# Patient Record
Sex: FEMALE | Race: WHITE | NOT HISPANIC OR LATINO | URBAN - METROPOLITAN AREA
[De-identification: names, ages, dates, MRNs, and addresses within clinical notes are randomized per-mention and may not be internally consistent; named-entity substitution may affect disease eponyms.]

---

## 2023-09-17 RX ORDER — LEVONORGESTREL AND ETHINYL ESTRADIOL 0.15-0.03
1 KIT ORAL DAILY
COMMUNITY
End: 2023-10-13 | Stop reason: SDUPTHER

## 2023-09-17 RX ORDER — BUSPIRONE HYDROCHLORIDE 7.5 MG/1
1 TABLET ORAL 2 TIMES DAILY
COMMUNITY

## 2023-10-13 ENCOUNTER — TELEPHONE (OUTPATIENT)
Dept: OBSTETRICS AND GYNECOLOGY | Facility: CLINIC | Age: 23
End: 2023-10-13
Payer: COMMERCIAL

## 2023-10-13 DIAGNOSIS — Z30.41 SURVEILLANCE FOR BIRTH CONTROL, ORAL CONTRACEPTIVES: Primary | ICD-10-CM

## 2023-10-13 RX ORDER — LEVONORGESTREL AND ETHINYL ESTRADIOL 0.15-0.03
1 KIT ORAL DAILY
Qty: 84 TABLET | Refills: 0 | Status: SHIPPED | OUTPATIENT
Start: 2023-10-13 | End: 2023-11-22 | Stop reason: SDUPTHER

## 2023-10-13 NOTE — TELEPHONE ENCOUNTER
Pt calling has annual in November, requesting refill for her Altavera until her annual. Please send to pharmacy on file

## 2023-11-21 PROBLEM — Z30.41 SURVEILLANCE FOR BIRTH CONTROL, ORAL CONTRACEPTIVES: Status: ACTIVE | Noted: 2023-11-21

## 2023-11-21 PROBLEM — Z01.419 ENCOUNTER FOR ANNUAL ROUTINE GYNECOLOGICAL EXAMINATION: Status: ACTIVE | Noted: 2023-11-21

## 2023-11-21 PROBLEM — Z11.3 SCREEN FOR STD (SEXUALLY TRANSMITTED DISEASE): Status: ACTIVE | Noted: 2023-11-21

## 2023-11-21 ASSESSMENT — ENCOUNTER SYMPTOMS
VOMITING: 0
SHORTNESS OF BREATH: 0
FEVER: 0
CHILLS: 0
COUGH: 0
FATIGUE: 0
DYSURIA: 0
UNEXPECTED WEIGHT CHANGE: 0
NAUSEA: 0
HEADACHES: 0
COLOR CHANGE: 0
ABDOMINAL PAIN: 0
DIZZINESS: 0

## 2023-11-22 ENCOUNTER — OFFICE VISIT (OUTPATIENT)
Dept: OBSTETRICS AND GYNECOLOGY | Facility: CLINIC | Age: 23
End: 2023-11-22
Payer: COMMERCIAL

## 2023-11-22 VITALS
DIASTOLIC BLOOD PRESSURE: 88 MMHG | WEIGHT: 142 LBS | BODY MASS INDEX: 21.52 KG/M2 | SYSTOLIC BLOOD PRESSURE: 136 MMHG | HEIGHT: 68 IN

## 2023-11-22 DIAGNOSIS — Z30.41 SURVEILLANCE FOR BIRTH CONTROL, ORAL CONTRACEPTIVES: ICD-10-CM

## 2023-11-22 DIAGNOSIS — Z11.3 SCREEN FOR STD (SEXUALLY TRANSMITTED DISEASE): ICD-10-CM

## 2023-11-22 DIAGNOSIS — Z01.419 ENCOUNTER FOR ANNUAL ROUTINE GYNECOLOGICAL EXAMINATION: Primary | ICD-10-CM

## 2023-11-22 PROCEDURE — 99395 PREV VISIT EST AGE 18-39: CPT

## 2023-11-22 PROCEDURE — 1036F TOBACCO NON-USER: CPT

## 2023-11-22 PROCEDURE — 87800 DETECT AGNT MULT DNA DIREC: CPT

## 2023-11-22 RX ORDER — LEVONORGESTREL AND ETHINYL ESTRADIOL 0.15-0.03
1 KIT ORAL DAILY
Qty: 84 TABLET | Refills: 3 | Status: SHIPPED | OUTPATIENT
Start: 2023-11-22 | End: 2024-11-21

## 2023-11-22 ASSESSMENT — ENCOUNTER SYMPTOMS
PSYCHIATRIC NEGATIVE: 0
CARDIOVASCULAR NEGATIVE: 0
GASTROINTESTINAL NEGATIVE: 0
CONSTITUTIONAL NEGATIVE: 0
ENDOCRINE NEGATIVE: 0
HEMATOLOGIC/LYMPHATIC NEGATIVE: 0
MUSCULOSKELETAL NEGATIVE: 0
EYES NEGATIVE: 0
NEUROLOGICAL NEGATIVE: 0
RESPIRATORY NEGATIVE: 0
ALLERGIC/IMMUNOLOGIC NEGATIVE: 0

## 2023-11-22 ASSESSMENT — LIFESTYLE VARIABLES
HOW OFTEN DO YOU HAVE A DRINK CONTAINING ALCOHOL: 2-4 TIMES A MONTH
SKIP TO QUESTIONS 9-10: 1
HOW MANY STANDARD DRINKS CONTAINING ALCOHOL DO YOU HAVE ON A TYPICAL DAY: 1 OR 2
HOW OFTEN DO YOU HAVE SIX OR MORE DRINKS ON ONE OCCASION: NEVER
AUDIT-C TOTAL SCORE: 2

## 2023-11-22 ASSESSMENT — PATIENT HEALTH QUESTIONNAIRE - PHQ9
1. LITTLE INTEREST OR PLEASURE IN DOING THINGS: NOT AT ALL
2. FEELING DOWN, DEPRESSED OR HOPELESS: NOT AT ALL
SUM OF ALL RESPONSES TO PHQ9 QUESTIONS 1 & 2: 0

## 2023-11-22 NOTE — PROGRESS NOTES
"Subjective   Alannah Simeon is a 23 y.o. female who is here for a routine GYN exam. Last saw Dr. Finn 2022. Doing well, no concerns or complaints. 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 Denies vaginal irritation symptoms. Denies breast changes or concerns.    Complaints: none  Periods: regular  Dysmenorrhea: none    Current contraception: OCP  History of abnormal Pap smear: yes  History of abnormal mammogram: no      OB History          0    Para   0    Term   0       0    AB   0    Living   0         SAB   0    IAB   0    Ectopic   0    Multiple   0    Live Births   0                  Review of Systems   Constitutional:  Negative for chills, fatigue, fever and unexpected weight change.   Respiratory:  Negative for cough and shortness of breath.    Gastrointestinal:  Negative for abdominal pain, nausea and vomiting.   Genitourinary:  Negative for dyspareunia, dysuria, pelvic pain and vaginal discharge.   Skin:  Negative for color change and rash.   Neurological:  Negative for dizziness and headaches.       Objective   /88   Ht 1.715 m (5' 7.5\")   Wt 64.4 kg (142 lb)   LMP 2023   BMI 21.91 kg/m²        General:   Alert and oriented, in no acute distress   Neck: Supple. No visible thyromegaly.    Breast/Axilla: Normal to palpation bilaterally without masses, skin changes, or nipple discharge.    Abdomen: Soft, non-tender, without masses or organomegaly   Vulva: Normal architecture without erythema, masses, or lesions.    Vagina: Normal mucosa without lesions, masses, or atrophy. Small amount discharge.   Cervix: Normal without masses, lesions, or signs of cervicitis; STI screening swab obtained   Uterus: Normal, mobile, non-enlarged uterus   Adnexa: Normal without masses or lesions   Pelvic Floor Normal    Psych Normal affect. Normal mood.      Assessment/Plan   -UTD on pap smear, last pap ASCUS, HPV " negative; next pap due 8/2025.  -STI screening obtained.  -Continue OCP as tolerated, no CI to medication at this time, refills sent to patient's pharmacy for 1 year supply.    Fe Alonso PA-C

## 2023-11-23 LAB
C TRACH RRNA SPEC QL NAA+PROBE: NEGATIVE
N GONORRHOEA DNA SPEC QL PROBE+SIG AMP: NEGATIVE

## 2024-07-10 ENCOUNTER — TELEPHONE (OUTPATIENT)
Dept: OBSTETRICS AND GYNECOLOGY | Facility: CLINIC | Age: 24
End: 2024-07-10
Payer: COMMERCIAL

## 2024-07-10 DIAGNOSIS — Z30.41 SURVEILLANCE FOR BIRTH CONTROL, ORAL CONTRACEPTIVES: ICD-10-CM

## 2024-07-10 RX ORDER — LEVONORGESTREL AND ETHINYL ESTRADIOL 0.15-0.03
1 KIT ORAL DAILY
Qty: 84 TABLET | Refills: 3 | Status: SHIPPED | OUTPATIENT
Start: 2024-07-10 | End: 2025-07-10

## 2024-11-26 ENCOUNTER — OFFICE VISIT (OUTPATIENT)
Dept: OBSTETRICS AND GYNECOLOGY | Facility: CLINIC | Age: 24
End: 2024-11-26
Payer: COMMERCIAL

## 2024-11-26 VITALS
DIASTOLIC BLOOD PRESSURE: 71 MMHG | WEIGHT: 150 LBS | SYSTOLIC BLOOD PRESSURE: 112 MMHG | BODY MASS INDEX: 22.73 KG/M2 | HEIGHT: 68 IN

## 2024-11-26 DIAGNOSIS — Z30.41 SURVEILLANCE FOR BIRTH CONTROL, ORAL CONTRACEPTIVES: ICD-10-CM

## 2024-11-26 DIAGNOSIS — Z01.419 ENCOUNTER FOR ANNUAL ROUTINE GYNECOLOGICAL EXAMINATION: Primary | ICD-10-CM

## 2024-11-26 PROCEDURE — 3008F BODY MASS INDEX DOCD: CPT

## 2024-11-26 PROCEDURE — 1036F TOBACCO NON-USER: CPT

## 2024-11-26 PROCEDURE — 99395 PREV VISIT EST AGE 18-39: CPT

## 2024-11-26 RX ORDER — LEVONORGESTREL AND ETHINYL ESTRADIOL 0.15-0.03
1 KIT ORAL DAILY
Qty: 84 TABLET | Refills: 3 | Status: SHIPPED | OUTPATIENT
Start: 2024-11-26 | End: 2025-11-26

## 2024-11-26 RX ORDER — MONTELUKAST SODIUM 4 MG/1
1 TABLET, CHEWABLE ORAL 2 TIMES DAILY
COMMUNITY
Start: 2024-11-16 | End: 2024-12-16

## 2024-11-26 ASSESSMENT — PATIENT HEALTH QUESTIONNAIRE - PHQ9
2. FEELING DOWN, DEPRESSED OR HOPELESS: NOT AT ALL
SUM OF ALL RESPONSES TO PHQ9 QUESTIONS 1 & 2: 0
1. LITTLE INTEREST OR PLEASURE IN DOING THINGS: NOT AT ALL

## 2024-11-26 ASSESSMENT — LIFESTYLE VARIABLES
HOW OFTEN DO YOU HAVE A DRINK CONTAINING ALCOHOL: MONTHLY OR LESS
HOW MANY STANDARD DRINKS CONTAINING ALCOHOL DO YOU HAVE ON A TYPICAL DAY: 1 OR 2
HOW OFTEN DO YOU HAVE SIX OR MORE DRINKS ON ONE OCCASION: NEVER
AUDIT-C TOTAL SCORE: 1
SKIP TO QUESTIONS 9-10: 1

## 2024-11-26 ASSESSMENT — ENCOUNTER SYMPTOMS
DIZZINESS: 0
HEADACHES: 0
COUGH: 0
DEPRESSION: 0
VOMITING: 0
FEVER: 0
CHILLS: 0
FATIGUE: 0
LOSS OF SENSATION IN FEET: 0
SHORTNESS OF BREATH: 0
ABDOMINAL PAIN: 0
COLOR CHANGE: 0
UNEXPECTED WEIGHT CHANGE: 0
NAUSEA: 0
OCCASIONAL FEELINGS OF UNSTEADINESS: 0
DYSURIA: 0

## 2024-11-26 ASSESSMENT — PAIN SCALES - GENERAL: PAINLEVEL_OUTOF10: 0-NO PAIN

## 2024-11-26 NOTE — PROGRESS NOTES
"Christiano Simeon \"Jennifer" is a 24 y.o. female who is here for a routine GYN exam. I last saw her 2023. 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 Doing well no concerns or complaints. Tolerating her OCP well; regular bleeding pattern; denies BTB; no CI to OCP use. Denies breast or vaginal concerns. No new partners or activity since I saw her last; so STI screening declined.     Complaints:   none  Periods: regular   Dysmenorrhea:  none    Current contraception: OCP  History of abnormal Pap smear: no  History of abnormal mammogram: no      OB History          0    Para   0    Term   0       0    AB   0    Living   0         SAB   0    IAB   0    Ectopic   0    Multiple   0    Live Births   0                  Review of Systems   Constitutional:  Negative for chills, fatigue, fever and unexpected weight change.   Respiratory:  Negative for cough and shortness of breath.    Gastrointestinal:  Negative for abdominal pain, nausea and vomiting.   Genitourinary:  Negative for dyspareunia, dysuria, pelvic pain and vaginal discharge.   Skin:  Negative for color change and rash.   Neurological:  Negative for dizziness and headaches.       Objective   /71   Ht 1.715 m (5' 7.5\")   Wt 68 kg (150 lb)   LMP 2024   BMI 23.15 kg/m²        General:   Alert and oriented, in no acute distress   Neck: Supple. No visible thyromegaly.    Breast/Axilla: Normal to palpation bilaterally without masses, skin changes, or nipple discharge.    Abdomen: Soft, non-tender, without masses or organomegaly   Vulva: Normal architecture without erythema, masses, or lesions.    Vagina: Normal mucosa without lesions, masses, or atrophy. No abnormal vaginal discharge.    Cervix: Normal without masses, lesions, or signs of cervicitis   Uterus: Normal, mobile, non-enlarged uterus   Adnexa: Normal without masses or lesions   Pelvic Floor normal   Psych Normal affect. Normal mood.  "     Assessment/Plan   Diagnoses and all orders for this visit:  Encounter for annual routine gynecological examination   - Next pap due 8/2025.   - Declines STI screening.  Surveillance for birth control, oral contraceptives  -     levonorgestreL-ethinyl estrad (Altavera, 28,) 0.15-0.03 mg tablet; Take 1 tablet by mouth once daily.      Fe Alonso PA-C

## 2025-01-24 PROCEDURE — 88305 TISSUE EXAM BY PATHOLOGIST: CPT | Performed by: DERMATOLOGY

## 2025-01-28 ENCOUNTER — LAB REQUISITION (OUTPATIENT)
Dept: DERMATOPATHOLOGY | Facility: CLINIC | Age: 25
End: 2025-01-28
Payer: COMMERCIAL

## 2025-01-28 DIAGNOSIS — D48.5 NEOPLASM OF UNCERTAIN BEHAVIOR OF SKIN: ICD-10-CM

## 2025-01-29 LAB
LABORATORY COMMENT REPORT: NORMAL
PATH REPORT.FINAL DX SPEC: NORMAL
PATH REPORT.GROSS SPEC: NORMAL
PATH REPORT.MICROSCOPIC SPEC OTHER STN: NORMAL
PATH REPORT.RELEVANT HX SPEC: NORMAL
PATH REPORT.TOTAL CANCER: NORMAL

## 2025-02-27 ENCOUNTER — OFFICE VISIT (OUTPATIENT)
Dept: PRIMARY CARE | Facility: CLINIC | Age: 25
End: 2025-02-27
Payer: COMMERCIAL

## 2025-02-27 VITALS
WEIGHT: 139 LBS | HEART RATE: 85 BPM | SYSTOLIC BLOOD PRESSURE: 122 MMHG | DIASTOLIC BLOOD PRESSURE: 70 MMHG | OXYGEN SATURATION: 99 % | BODY MASS INDEX: 21.45 KG/M2

## 2025-02-27 DIAGNOSIS — Z11.4 SCREENING FOR HIV WITHOUT PRESENCE OF RISK FACTORS: ICD-10-CM

## 2025-02-27 DIAGNOSIS — Z13.1 SCREENING FOR DIABETES MELLITUS (DM): ICD-10-CM

## 2025-02-27 DIAGNOSIS — Z13.21 ENCOUNTER FOR VITAMIN DEFICIENCY SCREENING: Primary | ICD-10-CM

## 2025-02-27 DIAGNOSIS — Z13.220 SCREENING FOR LIPID DISORDERS: ICD-10-CM

## 2025-02-27 DIAGNOSIS — Z13.29 SCREENING FOR THYROID DISORDER: ICD-10-CM

## 2025-02-27 DIAGNOSIS — Z00.00 ENCOUNTER FOR ANNUAL PHYSICAL EXAMINATION EXCLUDING GYNECOLOGICAL EXAMINATION IN A PATIENT OLDER THAN 17 YEARS: ICD-10-CM

## 2025-02-27 DIAGNOSIS — Z11.59 ENCOUNTER FOR HEPATITIS C SCREENING TEST FOR LOW RISK PATIENT: ICD-10-CM

## 2025-02-27 DIAGNOSIS — K58.2 IRRITABLE BOWEL SYNDROME WITH BOTH CONSTIPATION AND DIARRHEA: ICD-10-CM

## 2025-02-27 DIAGNOSIS — F41.9 ANXIETY: ICD-10-CM

## 2025-02-27 PROCEDURE — 1036F TOBACCO NON-USER: CPT

## 2025-02-27 PROCEDURE — 99395 PREV VISIT EST AGE 18-39: CPT

## 2025-02-27 ASSESSMENT — PAIN SCALES - GENERAL: PAINLEVEL_OUTOF10: 0-NO PAIN

## 2025-02-27 ASSESSMENT — PATIENT HEALTH QUESTIONNAIRE - PHQ9
2. FEELING DOWN, DEPRESSED OR HOPELESS: NOT AT ALL
SUM OF ALL RESPONSES TO PHQ9 QUESTIONS 1 AND 2: 0
1. LITTLE INTEREST OR PLEASURE IN DOING THINGS: NOT AT ALL

## 2025-02-27 ASSESSMENT — ENCOUNTER SYMPTOMS
DEPRESSION: 0
OCCASIONAL FEELINGS OF UNSTEADINESS: 0
LOSS OF SENSATION IN FEET: 0

## 2025-02-27 ASSESSMENT — COLUMBIA-SUICIDE SEVERITY RATING SCALE - C-SSRS
6. HAVE YOU EVER DONE ANYTHING, STARTED TO DO ANYTHING, OR PREPARED TO DO ANYTHING TO END YOUR LIFE?: NO
2. HAVE YOU ACTUALLY HAD ANY THOUGHTS OF KILLING YOURSELF?: NO
1. IN THE PAST MONTH, HAVE YOU WISHED YOU WERE DEAD OR WISHED YOU COULD GO TO SLEEP AND NOT WAKE UP?: NO

## 2025-02-27 NOTE — PATIENT INSTRUCTIONS
Assessment/Plan   Assessment & Plan  Encounter for vitamin deficiency screening         Screening for thyroid disorder         Screening for diabetes mellitus (DM)         Screening for HIV without presence of risk factors         Encounter for hepatitis C screening test for low risk patient         Screening for lipid disorders         Encounter for annual physical examination excluding gynecological examination in a patient older than 17 years    LAB Order/ BLOOD TESTS   I have ordered lab work for you to get done. This should be fasting. Nothing to eat or drink after midnight besides black tea,  black coffee, or water. If you do not hear from this office within two days of having your labs done, please call for your results.   Please call to schedule an appointment:   [facility] Scheduling phone number is [phone]  You can also schedule an appointment online by logging into   [facility]    Irritable bowel syndrome with both constipation and diarrhea    Orders:    Referral to Gastroenterology; Future    Anxiety  ANXIETY:   Stress/Anxiety reduction interventions:    -Relaxation techniques- deep breathing, meditation, acupuncture, guided imagery, yoga  -3-3-3 sit down in a quiet room. Look around the room and name 3 items you see. Then close your eyes and listen and quietly name 3 items you hear around you. Lastly, move 3 different body parts in a [place] 10 times each  -Avoid caffeine, alcohol, illicit drug use  -Get enough sleep, 7-9 hours per night  -Eat a healthy diet, prioritizing lean proteins, fruits/vegetables, and whole grains  -Purposeful movement daily- walking, biking, strength training  -Speaking with a counselor can be very helpful.

## 2025-02-27 NOTE — PROGRESS NOTES
Subjective   Patient ID: Carolann Simeon is a 24 y.o. female who presents for Establish Care. Former [facility] patient. Annual physical     HPI   Patient denies any falls, urgent care, ER, hospitalization, new diagnoses, surgeries since they were here last.  Denies any issues with chest pain, chest pressure, shortness of breath,  blood in stool, urinary urgency, frequency, blood in urine, muscle weakness in arms and legs, numbness or tingling in fingers or toes.    Admits to intermittent constipation diarrhea  She had colonoscopy in 2021 poor visualization of the colon due to poor prep patient was not returned for colonoscopy    Admits to increased anxiety at night with inability to fall asleep.    Review of Systems  Review of Systems negative except as noted in HPI and Chief complaint.    Current Outpatient Medications:     levonorgestreL-ethinyl estrad (Altavera, 28,) 0.15-0.03 mg tablet, Take 1 tablet by mouth once daily., Disp: 84 tablet, Rfl: 3]    Objective   /70 (BP Location: Left arm, Patient Position: Sitting, BP Cuff Size: Adult)   Pulse 85   Wt 63 kg (139 lb)   SpO2 99%   BMI 21.45 kg/m²     Physical Exam  Vitals reviewed.   Constitutional:       General: She is not in acute distress.     Appearance: Normal appearance.   HENT:      Head: Normocephalic.      Right Ear: Tympanic membrane normal.      Left Ear: Tympanic membrane normal.      Nose: Nose normal.      Mouth/Throat:      Mouth: Mucous membranes are moist.      Pharynx: Oropharynx is clear.   Eyes:      Extraocular Movements: Extraocular movements intact.      Conjunctiva/sclera: Conjunctivae normal.      Pupils: Pupils are equal, round, and reactive to light.   Cardiovascular:      Rate and Rhythm: Normal rate.      Pulses: Normal pulses.      Heart sounds: Normal heart sounds.   Pulmonary:      Effort: Pulmonary effort is normal.      Breath sounds: Normal breath sounds.   Abdominal:      General: Abdomen is flat. Bowel sounds are normal.       Palpations: Abdomen is soft.   Musculoskeletal:         General: Normal range of motion.   Skin:     General: Skin is warm and dry.      Capillary Refill: Capillary refill takes 2 to 3 seconds.   Neurological:      General: No focal deficit present.      Mental Status: She is alert and oriented to person, place, and time. Mental status is at baseline.   Psychiatric:         Mood and Affect: Mood normal.         Behavior: Behavior is cooperative.       Assessment/Plan   Assessment & Plan  Encounter for vitamin deficiency screening    Orders:    Vitamin D 25-Hydroxy,Total (for eval of Vitamin D levels); Future    Screening for thyroid disorder    Orders:    TSH with reflex to Free T4 if abnormal; Future    Screening for diabetes mellitus (DM)    Orders:    Hemoglobin A1C; Future    Screening for HIV without presence of risk factors    Orders:    HIV 1/2 Antigen/Antibody Screen with Reflex to Confirmation; Future    Encounter for hepatitis C screening test for low risk patient    Orders:    Hepatitis C antibody; Future    Screening for lipid disorders    Orders:    Lipid Panel; Future    Encounter for annual physical examination excluding gynecological examination in a patient older than 17 years    LAB Order/ BLOOD TESTS   I have ordered lab work for you to get done. This should be fasting. Nothing to eat or drink after midnight besides black tea,  black coffee, or water. If you do not hear from this office within two days of having your labs done, please call for your results.   Please call to schedule an appointment:   [facility] Scheduling phone number is [phone]  You can also schedule an appointment online by logging into   [facility]    Irritable bowel syndrome with both constipation and diarrhea    Orders:    Referral to Gastroenterology; Future  Referral placed at this time for Dr. Tana Rust please call to make an appointment to discuss ongoing concerns with her and possible repeat  colonoscopy  Anxiety  ANXIETY:   Stress/Anxiety reduction interventions:  -Relaxation techniques- deep breathing, meditation, acupuncture, guided imagery, yoga  -3-3-3 sit down in a quiet room. Look around the room and name 3 items you see. Then close your eyes and listen and quietly name 3 items you hear around you. Lastly, move 3 different body parts in a [place] 10 times each  -Avoid caffeine, alcohol, illicit drug use  -Get enough sleep, 7-9 hours per night  -Eat a healthy diet, prioritizing lean proteins, fruits/vegetables, and whole grains  -Purposeful movement daily- walking, biking, strength training  -Speaking with a counselor can be very helpful.        Time Spent  Prep time on day of patient encounter: 5 minutes  Time spent directly with patient, family or caregiver: 25 minutes  Documentation Time: 5 minutes    This note was dictated using DRAGON speech recognition software and was corrected for spelling or grammatical errors, but despite proofreading several typographical errors might be present that might affect the meaning of the content.  Lis Hilliard, CNP

## 2025-03-04 DIAGNOSIS — D72.19 OTHER EOSINOPHILIA: Primary | ICD-10-CM

## 2025-03-04 LAB
25(OH)D3+25(OH)D2 SERPL-MCNC: 40 NG/ML (ref 30–100)
ALBUMIN SERPL-MCNC: 4.7 G/DL (ref 3.6–5.1)
ALP SERPL-CCNC: 32 U/L (ref 31–125)
ALT SERPL-CCNC: 12 U/L (ref 6–29)
ANION GAP SERPL CALCULATED.4IONS-SCNC: 12 MMOL/L (CALC) (ref 7–17)
AST SERPL-CCNC: 14 U/L (ref 10–30)
BASOPHILS # BLD AUTO: 48 CELLS/UL (ref 0–200)
BASOPHILS NFR BLD AUTO: 0.6 %
BILIRUB SERPL-MCNC: 0.3 MG/DL (ref 0.2–1.2)
BUN SERPL-MCNC: 14 MG/DL (ref 7–25)
CALCIUM SERPL-MCNC: 9.2 MG/DL (ref 8.6–10.2)
CHLORIDE SERPL-SCNC: 103 MMOL/L (ref 98–110)
CHOLEST SERPL-MCNC: 157 MG/DL
CHOLEST/HDLC SERPL: 2.8 (CALC)
CO2 SERPL-SCNC: 24 MMOL/L (ref 20–32)
CREAT SERPL-MCNC: 0.77 MG/DL (ref 0.5–0.96)
EGFRCR SERPLBLD CKD-EPI 2021: 110 ML/MIN/1.73M2
EOSINOPHIL # BLD AUTO: 904 CELLS/UL (ref 15–500)
EOSINOPHIL NFR BLD AUTO: 11.3 %
ERYTHROCYTE [DISTWIDTH] IN BLOOD BY AUTOMATED COUNT: 12.1 % (ref 11–15)
EST. AVERAGE GLUCOSE BLD GHB EST-MCNC: 108 MG/DL
EST. AVERAGE GLUCOSE BLD GHB EST-SCNC: 6 MMOL/L
GLUCOSE SERPL-MCNC: 83 MG/DL (ref 65–139)
HBA1C MFR BLD: 5.4 % OF TOTAL HGB
HCT VFR BLD AUTO: 44.6 % (ref 35–45)
HCV AB SERPL QL IA: NORMAL
HDLC SERPL-MCNC: 57 MG/DL
HGB BLD-MCNC: 14.7 G/DL (ref 11.7–15.5)
HIV 1+2 AB+HIV1 P24 AG SERPL QL IA: NORMAL
LDLC SERPL CALC-MCNC: 83 MG/DL (CALC)
LYMPHOCYTES # BLD AUTO: 2024 CELLS/UL (ref 850–3900)
LYMPHOCYTES NFR BLD AUTO: 25.3 %
MCH RBC QN AUTO: 31.5 PG (ref 27–33)
MCHC RBC AUTO-ENTMCNC: 33 G/DL (ref 32–36)
MCV RBC AUTO: 95.5 FL (ref 80–100)
MONOCYTES # BLD AUTO: 624 CELLS/UL (ref 200–950)
MONOCYTES NFR BLD AUTO: 7.8 %
NEUTROPHILS # BLD AUTO: 4400 CELLS/UL (ref 1500–7800)
NEUTROPHILS NFR BLD AUTO: 55 %
NONHDLC SERPL-MCNC: 100 MG/DL (CALC)
PLATELET # BLD AUTO: 274 THOUSAND/UL (ref 140–400)
PMV BLD REES-ECKER: 11.1 FL (ref 7.5–12.5)
POTASSIUM SERPL-SCNC: 4 MMOL/L (ref 3.5–5.3)
PROT SERPL-MCNC: 7.4 G/DL (ref 6.1–8.1)
RBC # BLD AUTO: 4.67 MILLION/UL (ref 3.8–5.1)
SODIUM SERPL-SCNC: 139 MMOL/L (ref 135–146)
TRIGL SERPL-MCNC: 76 MG/DL
TSH SERPL-ACNC: 2.59 MIU/L
WBC # BLD AUTO: 8 THOUSAND/UL (ref 3.8–10.8)

## 2025-04-29 DIAGNOSIS — R19.7 DIARRHEA, UNSPECIFIED TYPE: Primary | ICD-10-CM

## 2025-04-29 DIAGNOSIS — K59.00 CONSTIPATION, UNSPECIFIED CONSTIPATION TYPE: ICD-10-CM

## 2025-04-30 LAB
BASOPHILS # BLD AUTO: 31 CELLS/UL (ref 0–200)
BASOPHILS NFR BLD AUTO: 0.6 %
EOSINOPHIL # BLD AUTO: 632 CELLS/UL (ref 15–500)
EOSINOPHIL NFR BLD AUTO: 12.4 %
ERYTHROCYTE [DISTWIDTH] IN BLOOD BY AUTOMATED COUNT: 11.7 % (ref 11–15)
HCT VFR BLD AUTO: 42.7 % (ref 35–45)
HGB BLD-MCNC: 14.4 G/DL (ref 11.7–15.5)
LYMPHOCYTES # BLD AUTO: 1418 CELLS/UL (ref 850–3900)
LYMPHOCYTES NFR BLD AUTO: 27.8 %
MCH RBC QN AUTO: 32.5 PG (ref 27–33)
MCHC RBC AUTO-ENTMCNC: 33.7 G/DL (ref 32–36)
MCV RBC AUTO: 96.4 FL (ref 80–100)
MONOCYTES # BLD AUTO: 439 CELLS/UL (ref 200–950)
MONOCYTES NFR BLD AUTO: 8.6 %
NEUTROPHILS # BLD AUTO: 2581 CELLS/UL (ref 1500–7800)
NEUTROPHILS NFR BLD AUTO: 50.6 %
PLATELET # BLD AUTO: 260 THOUSAND/UL (ref 140–400)
PMV BLD REES-ECKER: 10.2 FL (ref 7.5–12.5)
RBC # BLD AUTO: 4.43 MILLION/UL (ref 3.8–5.1)
WBC # BLD AUTO: 5.1 THOUSAND/UL (ref 3.8–10.8)

## 2025-05-05 ENCOUNTER — APPOINTMENT (OUTPATIENT)
Dept: RADIOLOGY | Facility: HOSPITAL | Age: 25
End: 2025-05-05
Payer: COMMERCIAL

## 2025-06-19 ENCOUNTER — PATIENT MESSAGE (OUTPATIENT)
Dept: PRIMARY CARE | Facility: CLINIC | Age: 25
End: 2025-06-19
Payer: COMMERCIAL

## 2025-06-27 ENCOUNTER — APPOINTMENT (OUTPATIENT)
Dept: PRIMARY CARE | Facility: CLINIC | Age: 25
End: 2025-06-27
Payer: COMMERCIAL

## 2025-07-30 ENCOUNTER — OFFICE VISIT (OUTPATIENT)
Dept: PRIMARY CARE | Facility: CLINIC | Age: 25
End: 2025-07-30
Payer: COMMERCIAL

## 2025-07-30 VITALS
HEIGHT: 68 IN | BODY MASS INDEX: 22.13 KG/M2 | WEIGHT: 146 LBS | DIASTOLIC BLOOD PRESSURE: 70 MMHG | OXYGEN SATURATION: 99 % | SYSTOLIC BLOOD PRESSURE: 124 MMHG | HEART RATE: 65 BPM

## 2025-07-30 DIAGNOSIS — R07.89 SENSATION OF CHEST TIGHTNESS: ICD-10-CM

## 2025-07-30 DIAGNOSIS — F41.9 ANXIETY: Primary | ICD-10-CM

## 2025-07-30 PROCEDURE — 93000 ELECTROCARDIOGRAM COMPLETE: CPT

## 2025-07-30 PROCEDURE — 3008F BODY MASS INDEX DOCD: CPT

## 2025-07-30 PROCEDURE — 1036F TOBACCO NON-USER: CPT

## 2025-07-30 PROCEDURE — 99214 OFFICE O/P EST MOD 30 MIN: CPT

## 2025-07-30 RX ORDER — FLUOXETINE 10 MG/1
10 CAPSULE ORAL DAILY
Qty: 30 CAPSULE | Refills: 1 | Status: SHIPPED | OUTPATIENT
Start: 2025-07-30 | End: 2025-09-28

## 2025-07-30 RX ORDER — HYDROXYZINE HYDROCHLORIDE 10 MG/1
10 TABLET, FILM COATED ORAL DAILY PRN
Qty: 30 TABLET | Refills: 0 | Status: SHIPPED | OUTPATIENT
Start: 2025-07-30

## 2025-07-30 ASSESSMENT — PATIENT HEALTH QUESTIONNAIRE - PHQ9
1. LITTLE INTEREST OR PLEASURE IN DOING THINGS: NOT AT ALL
2. FEELING DOWN, DEPRESSED OR HOPELESS: NOT AT ALL
SUM OF ALL RESPONSES TO PHQ9 QUESTIONS 1 AND 2: 0

## 2025-07-30 ASSESSMENT — ENCOUNTER SYMPTOMS
LOSS OF SENSATION IN FEET: 0
OCCASIONAL FEELINGS OF UNSTEADINESS: 0
DEPRESSION: 0

## 2025-07-30 ASSESSMENT — COLUMBIA-SUICIDE SEVERITY RATING SCALE - C-SSRS
2. HAVE YOU ACTUALLY HAD ANY THOUGHTS OF KILLING YOURSELF?: NO
1. IN THE PAST MONTH, HAVE YOU WISHED YOU WERE DEAD OR WISHED YOU COULD GO TO SLEEP AND NOT WAKE UP?: NO
6. HAVE YOU EVER DONE ANYTHING, STARTED TO DO ANYTHING, OR PREPARED TO DO ANYTHING TO END YOUR LIFE?: NO

## 2025-07-30 ASSESSMENT — PAIN SCALES - GENERAL: PAINLEVEL_OUTOF10: 0-NO PAIN

## 2025-07-30 NOTE — PATIENT INSTRUCTIONS
Assessment/Plan   Assessment & Plan  Anxiety  Stress/Anxiety reduction interventions:  -Relaxation techniques- deep breathing, meditation, acupuncture, guided imagery, yoga  -3-3-3 sit down in a quiet room. Look around the room and name 3 items you see. Then close your eyes and listen and quietly name 3 items you hear around you. Lastly, move 3 different body parts in a [place] 10 times each  -Avoid caffeine, alcohol, illicit drug use  -Get enough sleep, 7-9 hours per night  -Eat a healthy diet, prioritizing lean proteins, fruits/vegetables, and whole grains  -Purposeful movement daily- walking, biking, strength training  -Speaking with a counselor can be very helpful. Consider a referral to behavioral health  Begin Prozac once daily for anxiety  DO NOT STOP this medication suddenly. Please call my office to discuss a safe way to decrease/stop the dose   This could take 6-8 weeks to fully get in your system.   In the mean time, I have ordered hydroxazine to be taken as needed once per day  The first time you take this please take it at night- this could make you tired. See how you feel. If you are groggy or tired the following day please do not drive.     Sensation of chest tightness    Orders:    ECG 12 lead (Clinic Performed)    Holter or Event Cardiac Monitor; Future  I have ordered a holter monitor for you.  Please allow 24 hours from the time the holter is ordered and then Please call  [phone] to set up an appointment for application.

## 2025-07-30 NOTE — PROGRESS NOTES
"Subjective   Patient ID: Carolann Simeon is a 25 y.o. female who presents for DISCUSS ANXIETY MEDICATION.    HPI   25-year-old female presents today for anxiety discussion   Over the past 6 months she has been experiencing increasing anxiety    She was on buspar in the past did not feel it was working- she self Dcd  She does \"the box\" self soothe which helps some   She does admit to chest tightness and sob with increasing sx   Sx increase a few days before and the day of any activity outside of work   Does not have increasing Sx related to work     Review of Systems  Review of Systems negative except as noted in HPI and Chief complaint.  Current Medications[1]    Objective   /70 (BP Location: Left arm, Patient Position: Sitting, BP Cuff Size: Adult)   Pulse 65   Ht 1.715 m (5' 7.5\")   Wt 66.2 kg (146 lb)   SpO2 99%   BMI 22.53 kg/m²     Physical Exam  Vitals reviewed.     Musculoskeletal:      Cervical back: Normal range of motion.     Neurological:      General: No focal deficit present.      Mental Status: She is alert.     Psychiatric:         Mood and Affect: Mood normal.         Assessment/Plan   Assessment & Plan  Anxiety  Stress/Anxiety reduction interventions:  -Relaxation techniques- deep breathing, meditation, acupuncture, guided imagery, yoga  -3-3-3 sit down in a quiet room. Look around the room and name 3 items you see. Then close your eyes and listen and quietly name 3 items you hear around you. Lastly, move 3 different body parts in a [place] 10 times each  -Avoid caffeine, alcohol, illicit drug use  -Get enough sleep, 7-9 hours per night  -Eat a healthy diet, prioritizing lean proteins, fruits/vegetables, and whole grains  -Purposeful movement daily- walking, biking, strength training  -Speaking with a counselor can be very helpful. Consider a referral to behavioral health  Begin Prozac once daily for anxiety  DO NOT STOP this medication suddenly. Please call my office to discuss a safe way " to decrease/stop the dose   This could take 6-8 weeks to fully get in your system.   In the mean time, I have ordered hydroxazine to be taken as needed once per day  The first time you take this please take it at night- this could make you tired. See how you feel. If you are groggy or tired the following day please do not drive.     Sensation of chest tightness    Orders:    ECG 12 lead (Clinic Performed)    Holter or Event Cardiac Monitor; Future    Echocardiogram Stress Test; Future  I have ordered a holter monitor for you.  Please allow 24 hours from the time the holter is ordered and then Please call  [phone] to set up an appointment for application.    Echo stress test ordered due to recent EKG report. Please have this done. This office will call with results once received.       Vaccines are important! Please reconsider a flu shot and the Covid-19 vaccine  - Yearly seasonal flu shots are recommended, high dose is indicated for patient over 65.   - Tetanus boosters should be given every 10 yrs, this also covers for diphtheria and pertussis.   - most insurances cover the 2-shot series for shingles (shingrix) after the age of 50.   - Pneumonia vaccines are given routinely at age 65, however if you have a chronic heart or lung diagnosis this may be given before age 65.     Preventative cancer screens SAVE LIVES!  - mammograms are recommend yearly starting at the age of 40 in women, sometimes sooner based on family history.   - Prostate cancer screening is included in blood work in men over 40 every 2-4 years, unless risk high  - Colon cancer screening is recommended at age 45 for all patients, sometimes sooner based on family history.   - Cervical cancer screening through the use of a PAP test, is done on all women aged 21-65.   - other tests may be recommended if you are a smoker!     150 minutes of aerobic exercise is advised for good cardiovascular health and to maintain a healthy weight.   Please try to  work on maintaining a healthy body weight, with a BMI close to or at a BMI 19-25.    I recommend a whole-foods, plant-based diet, filled with fresh fruits, vegetables, seeds, beans, nuts and berries.    Discussed smoking cessation/Abstinence is best.     Abstaining from the use of alcohol is best. However if you choose to drink current guidelines are 2 or less drinks per day for men and 1.5 or less per day for women (and not all saved for one night on the weekend).    This note was dictated using DRAGON speech recognition software and was corrected for spelling or grammatical errors, but despite proofreading several typographical errors might be present that might affect the meaning of the content.  Lis Hilliard, RAYA             [1]   Current Outpatient Medications:     levonorgestreL-ethinyl estrad (Altavera, 28,) 0.15-0.03 mg tablet, Take 1 tablet by mouth once daily., Disp: 84 tablet, Rfl: 3    FLUoxetine (PROzac) 10 mg capsule, Take 1 capsule (10 mg) by mouth once daily., Disp: 30 capsule, Rfl: 1    hydrOXYzine HCL (Atarax) 10 mg tablet, Take 1 tablet (10 mg) by mouth once daily as needed for anxiety., Disp: 30 tablet, Rfl: 0

## 2025-08-18 ENCOUNTER — APPOINTMENT (OUTPATIENT)
Dept: CARDIOLOGY | Facility: HOSPITAL | Age: 25
End: 2025-08-18
Payer: COMMERCIAL

## 2025-08-19 ENCOUNTER — HOSPITAL ENCOUNTER (OUTPATIENT)
Dept: CARDIOLOGY | Facility: HOSPITAL | Age: 25
Discharge: HOME | End: 2025-08-19
Payer: COMMERCIAL

## 2025-08-19 ENCOUNTER — APPOINTMENT (OUTPATIENT)
Dept: CARDIOLOGY | Facility: HOSPITAL | Age: 25
End: 2025-08-19
Payer: COMMERCIAL

## 2025-08-19 DIAGNOSIS — R07.89 SENSATION OF CHEST TIGHTNESS: ICD-10-CM

## 2025-08-19 PROCEDURE — 93018 CV STRESS TEST I&R ONLY: CPT | Performed by: INTERNAL MEDICINE

## 2025-08-19 PROCEDURE — 93246 EXT ECG>7D<15D RECORDING: CPT

## 2025-08-19 PROCEDURE — 93017 CV STRESS TEST TRACING ONLY: CPT

## 2025-08-19 PROCEDURE — 93350 STRESS TTE ONLY: CPT | Performed by: INTERNAL MEDICINE

## 2025-08-19 PROCEDURE — 93016 CV STRESS TEST SUPVJ ONLY: CPT | Performed by: INTERNAL MEDICINE

## 2025-08-20 ENCOUNTER — RESULTS FOLLOW-UP (OUTPATIENT)
Dept: PRIMARY CARE | Facility: CLINIC | Age: 25
End: 2025-08-20
Payer: COMMERCIAL

## 2025-08-22 DIAGNOSIS — F41.9 ANXIETY: ICD-10-CM

## 2025-08-22 RX ORDER — HYDROXYZINE HYDROCHLORIDE 10 MG/1
10 TABLET, FILM COATED ORAL DAILY PRN
Qty: 90 TABLET | Refills: 1 | Status: SHIPPED | OUTPATIENT
Start: 2025-08-22

## 2025-08-22 RX ORDER — FLUOXETINE 10 MG/1
10 CAPSULE ORAL DAILY
Qty: 90 CAPSULE | Refills: 1 | Status: SHIPPED | OUTPATIENT
Start: 2025-08-22

## 2025-09-02 ENCOUNTER — OFFICE VISIT (OUTPATIENT)
Dept: PRIMARY CARE | Facility: CLINIC | Age: 25
End: 2025-09-02
Payer: COMMERCIAL

## 2025-09-02 VITALS
DIASTOLIC BLOOD PRESSURE: 64 MMHG | SYSTOLIC BLOOD PRESSURE: 112 MMHG | HEART RATE: 79 BPM | OXYGEN SATURATION: 97 % | RESPIRATION RATE: 16 BRPM | WEIGHT: 143.2 LBS | BODY MASS INDEX: 22.1 KG/M2

## 2025-09-02 DIAGNOSIS — F41.9 ANXIETY: Primary | ICD-10-CM

## 2025-09-02 PROCEDURE — 1036F TOBACCO NON-USER: CPT

## 2025-09-02 PROCEDURE — 99213 OFFICE O/P EST LOW 20 MIN: CPT

## 2025-09-02 ASSESSMENT — PATIENT HEALTH QUESTIONNAIRE - PHQ9
2. FEELING DOWN, DEPRESSED OR HOPELESS: NOT AT ALL
1. LITTLE INTEREST OR PLEASURE IN DOING THINGS: NOT AT ALL
SUM OF ALL RESPONSES TO PHQ9 QUESTIONS 1 AND 2: 0

## 2025-09-02 ASSESSMENT — PAIN SCALES - GENERAL: PAINLEVEL_OUTOF10: 0-NO PAIN

## 2025-09-02 ASSESSMENT — ENCOUNTER SYMPTOMS
LOSS OF SENSATION IN FEET: 0
OCCASIONAL FEELINGS OF UNSTEADINESS: 0
DEPRESSION: 0